# Patient Record
Sex: FEMALE | Race: WHITE | NOT HISPANIC OR LATINO | URBAN - METROPOLITAN AREA
[De-identification: names, ages, dates, MRNs, and addresses within clinical notes are randomized per-mention and may not be internally consistent; named-entity substitution may affect disease eponyms.]

---

## 2017-05-22 ENCOUNTER — GENERIC CONVERSION - ENCOUNTER (OUTPATIENT)
Dept: OTHER | Facility: OTHER | Age: 22
End: 2017-05-22

## 2017-06-12 ENCOUNTER — GENERIC CONVERSION - ENCOUNTER (OUTPATIENT)
Dept: OTHER | Facility: OTHER | Age: 22
End: 2017-06-12

## 2017-11-24 ENCOUNTER — ALLSCRIPTS OFFICE VISIT (OUTPATIENT)
Dept: OTHER | Facility: OTHER | Age: 22
End: 2017-11-24

## 2017-11-24 DIAGNOSIS — R35.0 FREQUENCY OF MICTURITION: ICD-10-CM

## 2017-11-24 LAB
BILIRUB UR QL STRIP: ABNORMAL
COLOR UR: YELLOW
GLUCOSE (HISTORICAL): ABNORMAL
HGB UR QL STRIP.AUTO: ABNORMAL
KETONES UR STRIP-MCNC: ABNORMAL MG/DL
LEUKOCYTE ESTERASE UR QL STRIP: ABNORMAL
NITRITE UR QL STRIP: ABNORMAL
PH UR STRIP.AUTO: 5.5 [PH]
PROT UR STRIP-MCNC: ABNORMAL MG/DL
SP GR UR STRIP.AUTO: 1.02
UROBILINOGEN UR QL STRIP.AUTO: 0.2

## 2017-11-26 NOTE — PROGRESS NOTES
Assessment    1  Acute lower UTI (599 0) (N39 0)    Plan  Acute lower UTI    · Sulfamethoxazole-Trimethoprim 400-80 MG Oral Tablet; TAKE 1 TABLET TWICEDAILY   · Drink at least 6 glasses of clear liquids a day ; Status:Complete;   Done: 56NKT8454   · Drink plenty of fluids ; Status:Complete;   Done: 25XCP1284   · There are several things women can do to treat and prevent bladder infections  ;Status:Complete;   Done: 21AUN0486   · Call [phone] if: Pain when you urinate is not better in 3 days ; Status:Complete;  Done: 13JAH0551   · Call [phone] if: The symptoms come back after the medications are finished  ;Status:Complete;   Done: 26EDI3491   · Call [phone] if: You have nausea and vomiting that lasts longer than 8 hours  ;Status:Complete;   Done: 21QXN9111   · Call [phone] if: You have pain in the kidney or low back area ; Status:Complete;  Done: 00LTV9743   · Call [phone] if: You see blood in your urine ; Status:Complete;   Done:04Itj3773   · Call [phone] if: You start vomiting ; Status:Complete;   Done: 15UUO0679   · Call [phone] if: Your temperature is higher than 101F ; Status:Complete;   Done:24Nov2017  Health Maintenance    · Stop: Fluzone Quadrivalent 0 5 ML Intramuscular Suspension PrefilledSyringe  Urinary frequency    · (1) URINE CULTURE; Source:Urine, Clean Catch; Status:Active; Requestedfor:24Nov2017;    · Urine Dip Automated- POC; Status:Complete;   Done: 85PSW8206 02:49PM    Discussion/Summary    Discussed with patient will treat for infection and will send out urine for culture  Possible side effects of new medications were reviewed with the patient/guardian today  The treatment plan was reviewed with the patient/guardian   The patient/guardian understands and agrees with the treatment plan      Chief Complaint  UTI symptoms      History of Present Illness  HPI: Patient here with complaints of having frequency and burning with urination present for the past week no back pain or fevers that she is aware of  No nausea  Review of Systems   Constitutional: as noted in HPI   ENT: no ear ache, no loss of hearing, no nosebleeds or nasal discharge, no sore throat or hoarseness  Cardiovascular: no complaints of slow or fast heart rate, no chest pain, no palpitations, no leg claudication or lower extremity edema  Respiratory: no complaints of shortness of breath, no wheezing, no dyspnea on exertion, no orthopnea or PND  Gastrointestinal: no complaints of abdominal pain, no constipation, no nausea or diarrhea, no vomiting, no bloody stools  Genitourinary: as noted in HPI  Musculoskeletal: no complaints of arthralgia, no myalgia, no joint swelling or stiffness, no limb pain or swelling  ROS reviewed  Active Problems  1  Abdominal pain (789 00) (R10 9)   2  Need for Menactra vaccination (V03 89) (Z23)   3  Need for Tdap vaccination (V06 1) (Z23)   4  PPD screening test (V74 1) (Z11 1)    Past Medical History  1  History of bronchitis (V12 69) (Z87 09)   2  History of gastritis (V12 79) (Z87 19)   3  History of otitis media (V12 49) (Z86 69)   4  History of pharyngitis (V12 69) (Z87 09)  Active Problems And Past Medical History Reviewed: The active problems and past medical history were reviewed and updated today  Family History  Family History Reviewed: The family history was reviewed and updated today  Social History  The social history was reviewed and updated today  Surgical History  1  History of Tonsillectomy With Adenoidectomy  Surgical History Reviewed: The surgical history was reviewed and updated today  Current Meds   1  Orsythia 0 1-20 MG-MCG Oral Tablet; Take 1 tablet daily Recorded    The medication list was reviewed and updated today  Allergies  1   No Known Drug Allergies    Vitals   Recorded: 98YTH7046 02:43PM   Temperature 98 4 F   Heart Rate 72   Systolic 419   Diastolic 74   Height 5 ft 4 in   Weight 123 lb 8 0 oz   BMI Calculated 21 2   BSA Calculated 1 6   O2 Saturation 98       Physical Exam   Constitutional  General appearance: No acute distress, well appearing and well nourished  Pulmonary  Respiratory effort: No increased work of breathing or signs of respiratory distress  Auscultation of lungs: Clear to auscultation  Cardiovascular  Auscultation of heart: Normal rate and rhythm, normal S1 and S2, without murmurs  Examination of extremities for edema and/or varicosities: Normal    Abdomen  Abdomen: Non-tender, no masses  Liver and spleen: No hepatomegaly or splenomegaly  Lymphatic  Palpation of lymph nodes in neck: No lymphadenopathy  Results/Data  PHQ-9 Adult Depression Screening 65URJ3102 03:06PM User, [employer]     Test Name Result Flag Reference   PHQ-9 Adult Depression Score 0       Over the last two weeks, how often have you been bothered by any of the following problems? Little interest or pleasure in doing things: Not at all - 0 Feeling down, depressed, or hopeless: Not at all - 0 Trouble falling or staying asleep, or sleeping too much: Not at all - 0 Feeling tired or having little energy: Not at all - 0 Poor appetite or over eating: Not at all - 0 Feeling bad about yourself - or that you are a failure or have let yourself or your family down: Not at all - 0 Trouble concentrating on things, such as reading the newspaper or watching television: Not at all - 0 Moving or speaking so slowly that other people could have noticed   Or the opposite -  being so fidgety or restless that you have been moving around a lot more than usual: Not at all - 0 Thoughts that you would be better off dead, or of hurting yourself in some way: Not at all - 0   PHQ-9 Adult Depression Screening Negative     PHQ-9 Difficulty Level Not difficult at all     PHQ-9 Severity No Depression       Urine Dip Automated- POC 71UXH1725 02:49PM Italia Dolin     Test Name Result Flag Reference   Color Yellow     Leukocytes large A    Nitrite neg     Blood large A    Bilirubin neg     Urobilinogen 0 2     Protein neg     Ph 5 5     Specific Gravity 1 025     Ketone trace A    Glucose neg           Signatures   Electronically signed by : Buford Gottron; Nov 24 2017  2:58PM EST                       (Author)    Electronically signed by : Trent Baldwin MD; Nov 25 2017  9:48AM EST                       (Co-author)

## 2017-11-27 ENCOUNTER — APPOINTMENT (OUTPATIENT)
Dept: LAB | Facility: HOSPITAL | Age: 22
End: 2017-11-27
Payer: COMMERCIAL

## 2017-11-27 DIAGNOSIS — R35.0 FREQUENCY OF MICTURITION: ICD-10-CM

## 2017-11-27 PROCEDURE — 87077 CULTURE AEROBIC IDENTIFY: CPT

## 2017-11-27 PROCEDURE — 87086 URINE CULTURE/COLONY COUNT: CPT

## 2017-11-27 PROCEDURE — 87186 SC STD MICRODIL/AGAR DIL: CPT

## 2017-11-29 ENCOUNTER — GENERIC CONVERSION - ENCOUNTER (OUTPATIENT)
Dept: OTHER | Facility: OTHER | Age: 22
End: 2017-11-29

## 2017-11-29 LAB — BACTERIA UR CULT: ABNORMAL

## 2017-12-21 ENCOUNTER — ALLSCRIPTS OFFICE VISIT (OUTPATIENT)
Dept: OTHER | Facility: OTHER | Age: 22
End: 2017-12-21

## 2018-01-12 VITALS
WEIGHT: 123.5 LBS | DIASTOLIC BLOOD PRESSURE: 74 MMHG | BODY MASS INDEX: 21.08 KG/M2 | TEMPERATURE: 98.4 F | OXYGEN SATURATION: 98 % | SYSTOLIC BLOOD PRESSURE: 122 MMHG | HEART RATE: 72 BPM | HEIGHT: 64 IN

## 2018-01-15 NOTE — RESULT NOTES
Discussion/Summary   Positive for infection and the bactrim she is taking will treat infection      Verified Results  (1) URINE CULTURE 27Nov2017 06:14PM Johnson Hernandez    Order Number: [id number]     Test Name Result Flag Reference   CLINICAL REPORT (Report) A    Test:        Urine culture  Specimen Type:   Urine  Specimen Date:   11/27/2017 6:14 PM  Result Date:    11/29/2017 1:55 PM  Result Status:   Final result  Abnormal:      Yes  Resulting Lab:   [facility] [address] [place] [address]            Tel: [phone]      CULTURE                                       ------------------                                       80,000-89,000 cfu/ml Escherichia coli (Abnormal)      SUSCEPTIBILITY                                   ------------------                                                       Escherichia coli  METHOD                 ISAIAS  -------------------------------------  --------------------------  AMPICILLIN ($$)             >16 00 ug/ml Resistant  AMPICILLIN + SULBACTAM ($)       16/8 ug/ml  Intermediate  AZTREONAM ($$$)             <=8 ug/ml   Susceptible  CEFAZOLIN ($)              <=8 00 ug/ml Susceptible  CIPROFLOXACIN ($)            <=1 00 ug/ml Susceptible  GENTAMICIN ($$)             <=4 ug/ml   Susceptible  LEVOFLOXACIN ($)            <=2 00 ug/ml Susceptible  NITROFURANTOIN             <=32 ug/ml  Susceptible  PIPERACILLIN + TAZOBACTAM ($$$)     <=16 ug/ml  Susceptible  TETRACYCLINE              >8 ug/ml   Resistant  TOBRAMYCIN ($)             <=4 ug/ml   Susceptible  TRIMETHOPRIM + SULFAMETHOXAZOLE ($$$)  <=2/38 ug/ml Susceptible

## 2018-01-23 VITALS
BODY MASS INDEX: 21.06 KG/M2 | HEART RATE: 78 BPM | OXYGEN SATURATION: 98 % | TEMPERATURE: 98.5 F | WEIGHT: 123.38 LBS | DIASTOLIC BLOOD PRESSURE: 78 MMHG | SYSTOLIC BLOOD PRESSURE: 120 MMHG | HEIGHT: 64 IN

## 2018-01-23 NOTE — MISCELLANEOUS
Message  Return to work or school:   Kevin Romero is under my professional care   She was seen in my office on 12/21/17   She is able to return to work on  12/26/17            Signatures   Electronically signed by : Jagdish Wray; Dec 21 2017  3:50PM EST                       (Author)

## 2020-09-14 ENCOUNTER — OFFICE VISIT (OUTPATIENT)
Dept: FAMILY MEDICINE CLINIC | Facility: CLINIC | Age: 25
End: 2020-09-14
Payer: COMMERCIAL

## 2020-09-14 ENCOUNTER — TELEPHONE (OUTPATIENT)
Dept: ADMINISTRATIVE | Facility: OTHER | Age: 25
End: 2020-09-14

## 2020-09-14 VITALS
OXYGEN SATURATION: 97 % | HEIGHT: 63 IN | BODY MASS INDEX: 21.33 KG/M2 | RESPIRATION RATE: 16 BRPM | TEMPERATURE: 98.1 F | DIASTOLIC BLOOD PRESSURE: 64 MMHG | HEART RATE: 88 BPM | SYSTOLIC BLOOD PRESSURE: 112 MMHG | WEIGHT: 120.4 LBS

## 2020-09-14 DIAGNOSIS — F41.9 ANXIETY: Primary | ICD-10-CM

## 2020-09-14 DIAGNOSIS — Z23 NEED FOR INFLUENZA VACCINATION: ICD-10-CM

## 2020-09-14 PROCEDURE — 90686 IIV4 VACC NO PRSV 0.5 ML IM: CPT | Performed by: NURSE PRACTITIONER

## 2020-09-14 PROCEDURE — 99213 OFFICE O/P EST LOW 20 MIN: CPT | Performed by: NURSE PRACTITIONER

## 2020-09-14 PROCEDURE — 90471 IMMUNIZATION ADMIN: CPT | Performed by: NURSE PRACTITIONER

## 2020-09-14 PROCEDURE — 3725F SCREEN DEPRESSION PERFORMED: CPT | Performed by: NURSE PRACTITIONER

## 2020-09-14 RX ORDER — ESCITALOPRAM OXALATE 5 MG/1
5 TABLET ORAL DAILY
Qty: 30 TABLET | Refills: 2 | Status: SHIPPED | OUTPATIENT
Start: 2020-09-14 | End: 2020-10-14 | Stop reason: SDUPTHER

## 2020-09-14 RX ORDER — MEDROXYPROGESTERONE ACETATE 150 MG/ML
INJECTION, SUSPENSION INTRAMUSCULAR
COMMUNITY
Start: 2020-08-09 | End: 2021-08-18 | Stop reason: ALTCHOICE

## 2020-09-14 NOTE — TELEPHONE ENCOUNTER
----- Message from Becky Flower MA sent at 9/14/2020 12:40 PM EDT -----  Regarding: pap  09/14/20 12:40 PM    Hello, our patient Cj Patton has had Pap Smear (HPV) aka Cervical Cancer Screening completed/performed  Please assist in updating the patient chart by pulling the Care Everywhere (CE) document  The date of service is 2019       Thank you,  Becky Flower MA  [facility]

## 2020-09-14 NOTE — PROGRESS NOTES
Assessment/Plan:           Problem List Items Addressed This Visit        Other    Anxiety - Primary     Discussed options with patient will start Lexapro 5 mg daily and will f/u in 3 weeks for follow up          Relevant Medications    escitalopram (LEXAPRO) 5 mg tablet    Need for influenza vaccination    Relevant Orders    influenza vaccine, quadrivalent, 0 5 mL, preservative-free, for adult and pediatric patients 6 mos+ (AFLURIA, FLUARIX, FLULAVAL, FLUZONE) (Completed)            Subjective:      Patient ID: Nikos Francis is a 23 y o  female  Patient here today about her anxiety and reports that she has had anxiety for years and a few months ago having some paranoid about things all summer when she was driving thinking she was going to be in an accident or having someone break into her home  Patient denies any recent stress and is in a relationship  Patient denies any drugs or alcohol  Patient talks with her friends about how she is feeling  Patient positive history in the family  Patient denies any SI and no hospitalizations for mental health issues and feeling well  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  Patient did attend therapy as a child when her dad passed away  Patient has not taken a medication  Patient is feeling anxious and on edge feeling like she is irritable and hard to stop worrying about something awful may happen and concentration is affected  MARISSA-7 score 18 and PHQ-9 score 4       The following portions of the patient's history were reviewed and updated as appropriate: She  has no past medical history on file  She   Patient Active Problem List    Diagnosis Date Noted    Anxiety 09/14/2020    Need for influenza vaccination 09/14/2020     She  has no past surgical history on file  Her family history is not on file  She  has no history on file for tobacco, alcohol, and drug    She is allergic to dog epithelium allergy skin test     Review of Systems Constitutional: Negative for activity change, appetite change, chills, diaphoresis, fatigue, fever and unexpected weight change  HENT: Negative  Negative for congestion, ear pain, hearing loss, postnasal drip, sinus pressure, sinus pain, sneezing and sore throat  Eyes: Negative  Negative for pain, redness and visual disturbance  Respiratory: Negative  Negative for cough and shortness of breath  Cardiovascular: Negative for chest pain and leg swelling  Gastrointestinal: Negative for abdominal pain, diarrhea, nausea and vomiting  Endocrine: Negative  Genitourinary: Negative  Musculoskeletal: Negative for arthralgias  Skin: Negative  Allergic/Immunologic: Negative  Neurological: Negative for dizziness and light-headedness  Hematological: Negative  Psychiatric/Behavioral: Positive for decreased concentration  Negative for behavioral problems, dysphoric mood, self-injury, sleep disturbance and suicidal ideas  The patient is nervous/anxious  Objective:      /64 (BP Location: Left arm, Patient Position: Sitting, Cuff Size: Standard)   Pulse 88   Temp 98 1 °F (36 7 °C)   Resp 16   Ht 5' 3" (1 6 m)   Wt 54 6 kg (120 lb 6 4 oz)   SpO2 97%   BMI 21 33 kg/m²     Depression Screening Follow-up Plan: Patient's depression screening was positive with a PHQ-2 score of   Their PHQ-9 score was   Patient assessed for underlying major depression  They have no active suicidal ideations  Brief counseling provided and recommend additional follow-up/re-evaluation next office visit  Physical Exam  Vitals signs and nursing note reviewed  Constitutional:       General: She is not in acute distress  Appearance: She is well-developed  HENT:      Head: Normocephalic and atraumatic  Eyes:      Pupils: Pupils are equal, round, and reactive to light  Neck:      Musculoskeletal: Normal range of motion  Thyroid: No thyromegaly     Cardiovascular:      Rate and Rhythm: Normal rate and regular rhythm  Heart sounds: Normal heart sounds  No murmur  Pulmonary:      Effort: Pulmonary effort is normal  No respiratory distress  Breath sounds: Normal breath sounds  No wheezing  Abdominal:      General: Bowel sounds are normal       Palpations: Abdomen is soft  Musculoskeletal: Normal range of motion  Skin:     General: Skin is warm and dry  Neurological:      Mental Status: She is alert and oriented to person, place, and time  Psychiatric:         Attention and Perception: Attention and perception normal          Mood and Affect: Affect normal  Mood is anxious  Speech: Speech is rapid and pressured  Behavior: Behavior normal  Behavior is cooperative  Thought Content:  Thought content normal          Cognition and Memory: Cognition normal          Judgment: Judgment normal       Comments: Patient making limited eye contact and shaking leg entire visit

## 2020-09-14 NOTE — TELEPHONE ENCOUNTER
Upon review of the In Basket request we were able to locate, review, and update the patient chart as requested for Pap Smear (HPV) aka Cervical Cancer Screening  Any additional questions or concerns should be emailed to the Practice Liaisons via [email or web address] email, please do not reply via In Basket      Thank you  Marly Arriaza

## 2020-10-05 ENCOUNTER — OFFICE VISIT (OUTPATIENT)
Dept: FAMILY MEDICINE CLINIC | Facility: CLINIC | Age: 25
End: 2020-10-05
Payer: COMMERCIAL

## 2020-10-05 VITALS
HEART RATE: 81 BPM | SYSTOLIC BLOOD PRESSURE: 120 MMHG | BODY MASS INDEX: 21.26 KG/M2 | WEIGHT: 120 LBS | TEMPERATURE: 98.4 F | HEIGHT: 63 IN | DIASTOLIC BLOOD PRESSURE: 74 MMHG | OXYGEN SATURATION: 97 %

## 2020-10-05 DIAGNOSIS — F41.9 ANXIETY: Primary | ICD-10-CM

## 2020-10-05 PROBLEM — Z23 NEED FOR INFLUENZA VACCINATION: Status: RESOLVED | Noted: 2020-09-14 | Resolved: 2020-10-05

## 2020-10-05 PROCEDURE — 99213 OFFICE O/P EST LOW 20 MIN: CPT | Performed by: NURSE PRACTITIONER

## 2020-10-05 PROCEDURE — 1036F TOBACCO NON-USER: CPT | Performed by: NURSE PRACTITIONER

## 2020-10-14 DIAGNOSIS — F41.9 ANXIETY: ICD-10-CM

## 2020-10-14 RX ORDER — ESCITALOPRAM OXALATE 10 MG/1
10 TABLET ORAL DAILY
Qty: 30 TABLET | Refills: 2 | Status: SHIPPED | OUTPATIENT
Start: 2020-10-14 | End: 2020-11-13 | Stop reason: SDUPTHER

## 2020-11-02 ENCOUNTER — APPOINTMENT (OUTPATIENT)
Dept: LAB | Facility: CLINIC | Age: 25
End: 2020-11-02
Payer: COMMERCIAL

## 2020-11-02 ENCOUNTER — OFFICE VISIT (OUTPATIENT)
Dept: FAMILY MEDICINE CLINIC | Facility: CLINIC | Age: 25
End: 2020-11-02
Payer: COMMERCIAL

## 2020-11-02 VITALS
BODY MASS INDEX: 21.02 KG/M2 | HEART RATE: 88 BPM | OXYGEN SATURATION: 96 % | HEIGHT: 63 IN | WEIGHT: 118.6 LBS | RESPIRATION RATE: 16 BRPM | DIASTOLIC BLOOD PRESSURE: 66 MMHG | SYSTOLIC BLOOD PRESSURE: 112 MMHG | TEMPERATURE: 97 F

## 2020-11-02 DIAGNOSIS — F41.9 ANXIETY: ICD-10-CM

## 2020-11-02 DIAGNOSIS — N92.3 INTERMENSTRUAL SPOTTING: ICD-10-CM

## 2020-11-02 DIAGNOSIS — Z00.00 ANNUAL PHYSICAL EXAM: Primary | ICD-10-CM

## 2020-11-02 LAB
ALBUMIN SERPL BCP-MCNC: 4.5 G/DL (ref 3.5–5)
ALP SERPL-CCNC: 53 U/L (ref 46–116)
ALT SERPL W P-5'-P-CCNC: 21 U/L (ref 12–78)
ANION GAP SERPL CALCULATED.3IONS-SCNC: 6 MMOL/L (ref 4–13)
AST SERPL W P-5'-P-CCNC: 25 U/L (ref 5–45)
BASOPHILS # BLD AUTO: 0.07 THOUSANDS/ΜL (ref 0–0.1)
BASOPHILS NFR BLD AUTO: 1 % (ref 0–1)
BILIRUB SERPL-MCNC: 1.57 MG/DL (ref 0.2–1)
BUN SERPL-MCNC: 13 MG/DL (ref 5–25)
CALCIUM SERPL-MCNC: 9.1 MG/DL (ref 8.3–10.1)
CHLORIDE SERPL-SCNC: 109 MMOL/L (ref 100–108)
CO2 SERPL-SCNC: 27 MMOL/L (ref 21–32)
CREAT SERPL-MCNC: 0.99 MG/DL (ref 0.6–1.3)
EOSINOPHIL # BLD AUTO: 0.27 THOUSAND/ΜL (ref 0–0.61)
EOSINOPHIL NFR BLD AUTO: 5 % (ref 0–6)
ERYTHROCYTE [DISTWIDTH] IN BLOOD BY AUTOMATED COUNT: 12.7 % (ref 11.6–15.1)
FERRITIN SERPL-MCNC: 32 NG/ML (ref 8–388)
GFR SERPL CREATININE-BSD FRML MDRD: 81 ML/MIN/1.73SQ M
GLUCOSE SERPL-MCNC: 72 MG/DL (ref 65–140)
HCT VFR BLD AUTO: 43.6 % (ref 34.8–46.1)
HGB BLD-MCNC: 14.7 G/DL (ref 11.5–15.4)
IMM GRANULOCYTES # BLD AUTO: 0 THOUSAND/UL (ref 0–0.2)
IMM GRANULOCYTES NFR BLD AUTO: 0 % (ref 0–2)
IRON SATN MFR SERPL: 32 %
IRON SERPL-MCNC: 115 UG/DL (ref 50–170)
LYMPHOCYTES # BLD AUTO: 2.29 THOUSANDS/ΜL (ref 0.6–4.47)
LYMPHOCYTES NFR BLD AUTO: 39 % (ref 14–44)
MCH RBC QN AUTO: 30.7 PG (ref 26.8–34.3)
MCHC RBC AUTO-ENTMCNC: 33.7 G/DL (ref 31.4–37.4)
MCV RBC AUTO: 91 FL (ref 82–98)
MONOCYTES # BLD AUTO: 0.42 THOUSAND/ΜL (ref 0.17–1.22)
MONOCYTES NFR BLD AUTO: 7 % (ref 4–12)
NEUTROPHILS # BLD AUTO: 2.9 THOUSANDS/ΜL (ref 1.85–7.62)
NEUTS SEG NFR BLD AUTO: 48 % (ref 43–75)
NRBC BLD AUTO-RTO: 0 /100 WBCS
PLATELET # BLD AUTO: 258 THOUSANDS/UL (ref 149–390)
PMV BLD AUTO: 9.8 FL (ref 8.9–12.7)
POTASSIUM SERPL-SCNC: 4.1 MMOL/L (ref 3.5–5.3)
PROT SERPL-MCNC: 7.8 G/DL (ref 6.4–8.2)
RBC # BLD AUTO: 4.79 MILLION/UL (ref 3.81–5.12)
SODIUM SERPL-SCNC: 142 MMOL/L (ref 136–145)
TIBC SERPL-MCNC: 359 UG/DL (ref 250–450)
TSH SERPL DL<=0.05 MIU/L-ACNC: 1.13 UIU/ML (ref 0.36–3.74)
WBC # BLD AUTO: 5.95 THOUSAND/UL (ref 4.31–10.16)

## 2020-11-02 PROCEDURE — 99395 PREV VISIT EST AGE 18-39: CPT | Performed by: NURSE PRACTITIONER

## 2020-11-02 PROCEDURE — 84443 ASSAY THYROID STIM HORMONE: CPT

## 2020-11-02 PROCEDURE — 83540 ASSAY OF IRON: CPT

## 2020-11-02 PROCEDURE — 85025 COMPLETE CBC W/AUTO DIFF WBC: CPT

## 2020-11-02 PROCEDURE — 3008F BODY MASS INDEX DOCD: CPT | Performed by: NURSE PRACTITIONER

## 2020-11-02 PROCEDURE — 82728 ASSAY OF FERRITIN: CPT

## 2020-11-02 PROCEDURE — 36415 COLL VENOUS BLD VENIPUNCTURE: CPT

## 2020-11-02 PROCEDURE — 80053 COMPREHEN METABOLIC PANEL: CPT

## 2020-11-02 PROCEDURE — 83550 IRON BINDING TEST: CPT

## 2020-11-13 DIAGNOSIS — F41.9 ANXIETY: ICD-10-CM

## 2020-11-13 RX ORDER — ESCITALOPRAM OXALATE 10 MG/1
10 TABLET ORAL DAILY
Qty: 30 TABLET | Refills: 0 | Status: SHIPPED | OUTPATIENT
Start: 2020-11-13 | End: 2021-02-09 | Stop reason: ALTCHOICE

## 2021-02-09 ENCOUNTER — TELEMEDICINE (OUTPATIENT)
Dept: FAMILY MEDICINE CLINIC | Facility: CLINIC | Age: 26
End: 2021-02-09
Payer: COMMERCIAL

## 2021-02-09 DIAGNOSIS — F41.9 ANXIETY: Primary | ICD-10-CM

## 2021-02-09 PROCEDURE — 99213 OFFICE O/P EST LOW 20 MIN: CPT | Performed by: NURSE PRACTITIONER

## 2021-02-09 RX ORDER — SERTRALINE HYDROCHLORIDE 25 MG/1
25 TABLET, FILM COATED ORAL DAILY
Qty: 30 TABLET | Refills: 1 | Status: SHIPPED | OUTPATIENT
Start: 2021-02-09 | End: 2021-03-04

## 2021-02-09 NOTE — PROGRESS NOTES
Virtual Regular Visit      Assessment/Plan:    Problem List Items Addressed This Visit        Other    Anxiety - Primary     Patient will decrease her dose of Lexapro to 5 mg for the next two weeks then stop and also start the Sertraline 25 mg daily patient educated about the medications r/b of taking medication and taking 4-6 weeks to appreciate the effects of the medication will f/u in 4 weeks also discussed considering therapy as well  Relevant Medications    sertraline (ZOLOFT) 25 mg tablet               Reason for visit is   Chief Complaint   Patient presents with    Virtual Regular Visit        Encounter provider Jagdish Osuna [facility]    Provider located at [place] [address] [place] [id number]      Recent Visits  No visits were found meeting these conditions  Showing recent visits within past 7 days and meeting all other requirements     Today's Visits  Date Type Provider Dept   02/09/21 Telemedicine RONNIE Osuna [facility]   Showing today's visits and meeting all other requirements     Future Appointments  No visits were found meeting these conditions  Showing future appointments within next 150 days and meeting all other requirements        The patient was identified by name and date of birth  Naman Gonzalez was informed that this is a telemedicine visit and that the visit is being conducted through [address] [place] [address] and patient was informed that this is not a secure, HIPAA-compliant platform  She agrees to proceed     My office door was closed  No one else was in the room  She acknowledged consent and understanding of privacy and security of the video platform  The patient has agreed to participate and understands they can discontinue the visit at any time  Patient is aware this is a billable service  Subjective  Naman Gonzalez is a 23 y o  female          Patient is calling today and reports that she is having problems with her anxiety and thinking the medication is not effective  She is experiencing panic attacks and is not feeling well on the lexapro  She is having decrease in her energy motivation and finding she is more forgetful and fatigued no new triggering events  No past medical history on file  No past surgical history on file  Current Outpatient Medications   Medication Sig Dispense Refill    medroxyPROGESTERone acetate (DEPO-PROVERA SYRINGE) 150 mg/mL injection INJECT 150 MG INJECT INTO THE MUSCLE EVERY 3 (THREE) MONTHS   sertraline (ZOLOFT) 25 mg tablet Take 1 tablet (25 mg total) by mouth daily 30 tablet 1     No current facility-administered medications for this visit  Allergies   Allergen Reactions    Dog Epithelium Allergy Skin Test Other (See Comments)       Review of Systems   Constitutional: Positive for fatigue  Negative for activity change, appetite change, chills, diaphoresis, fever and unexpected weight change  HENT: Negative for congestion, ear pain, hearing loss, postnasal drip, sinus pressure, sinus pain, sneezing and sore throat  Eyes: Negative for pain, redness and visual disturbance  Respiratory: Negative for cough and shortness of breath  Cardiovascular: Negative for chest pain and leg swelling  Gastrointestinal: Negative for abdominal pain, diarrhea, nausea and vomiting  Musculoskeletal: Negative for arthralgias  Neurological: Negative for dizziness and light-headedness  Psychiatric/Behavioral: Negative for behavioral problems and dysphoric mood  The patient is nervous/anxious  Video Exam    There were no vitals filed for this visit  Physical Exam  Constitutional:       Appearance: Normal appearance  HENT:      Head: Normocephalic  Pulmonary:      Effort: Pulmonary effort is normal       Breath sounds: Normal breath sounds  Neurological:      Mental Status: She is alert and oriented to person, place, and time     Psychiatric: Mood and Affect: Mood normal          Behavior: Behavior normal          Thought Content: Thought content normal          Judgment: Judgment normal           I spent 15 minutes directly with the patient during this visit      Tian Garcia acknowledges that she has consented to an online visit or consultation  She understands that the online visit is based solely on information provided by her, and that, in the absence of a face-to-face physical evaluation by the physician, the diagnosis she receives is both limited and provisional in terms of accuracy and completeness  This is not intended to replace a full medical face-to-face evaluation by the physician  Joss Holbrook understands and accepts these terms

## 2021-02-09 NOTE — ASSESSMENT & PLAN NOTE
Patient will decrease her dose of Lexapro to 5 mg for the next two weeks then stop and also start the Sertraline 25 mg daily patient educated about the medications r/b of taking medication and taking 4-6 weeks to appreciate the effects of the medication will f/u in 4 weeks also discussed considering therapy as well

## 2021-03-04 DIAGNOSIS — F41.9 ANXIETY: ICD-10-CM

## 2021-03-04 RX ORDER — SERTRALINE HYDROCHLORIDE 25 MG/1
TABLET, FILM COATED ORAL
Qty: 30 TABLET | Refills: 1 | Status: SHIPPED | OUTPATIENT
Start: 2021-03-04 | End: 2021-04-06

## 2021-04-06 DIAGNOSIS — F41.9 ANXIETY: ICD-10-CM

## 2021-04-06 RX ORDER — SERTRALINE HYDROCHLORIDE 25 MG/1
TABLET, FILM COATED ORAL
Qty: 30 TABLET | Refills: 1 | Status: SHIPPED | OUTPATIENT
Start: 2021-04-06 | End: 2021-04-20

## 2021-04-20 DIAGNOSIS — F41.9 ANXIETY: ICD-10-CM

## 2021-04-20 RX ORDER — SERTRALINE HYDROCHLORIDE 25 MG/1
TABLET, FILM COATED ORAL
Qty: 90 TABLET | Refills: 1 | Status: SHIPPED | OUTPATIENT
Start: 2021-04-20 | End: 2021-08-18 | Stop reason: ALTCHOICE

## 2021-04-30 ENCOUNTER — OFFICE VISIT (OUTPATIENT)
Dept: FAMILY MEDICINE CLINIC | Facility: CLINIC | Age: 26
End: 2021-04-30
Payer: COMMERCIAL

## 2021-04-30 VITALS
HEART RATE: 83 BPM | DIASTOLIC BLOOD PRESSURE: 70 MMHG | BODY MASS INDEX: 21.4 KG/M2 | WEIGHT: 120.8 LBS | SYSTOLIC BLOOD PRESSURE: 122 MMHG | TEMPERATURE: 98.1 F | OXYGEN SATURATION: 99 % | HEIGHT: 63 IN

## 2021-04-30 DIAGNOSIS — F41.9 ANXIETY: Primary | ICD-10-CM

## 2021-04-30 PROCEDURE — 1036F TOBACCO NON-USER: CPT | Performed by: PHYSICIAN ASSISTANT

## 2021-04-30 PROCEDURE — 3008F BODY MASS INDEX DOCD: CPT | Performed by: PHYSICIAN ASSISTANT

## 2021-04-30 PROCEDURE — 3725F SCREEN DEPRESSION PERFORMED: CPT | Performed by: PHYSICIAN ASSISTANT

## 2021-04-30 PROCEDURE — 99214 OFFICE O/P EST MOD 30 MIN: CPT | Performed by: PHYSICIAN ASSISTANT

## 2021-04-30 RX ORDER — CETIRIZINE HYDROCHLORIDE 10 MG/1
10 TABLET ORAL DAILY
COMMUNITY

## 2021-04-30 RX ORDER — HYDROXYZINE HYDROCHLORIDE 25 MG/1
25 TABLET, FILM COATED ORAL EVERY 8 HOURS PRN
Qty: 30 TABLET | Refills: 0 | Status: SHIPPED | OUTPATIENT
Start: 2021-04-30 | End: 2021-08-18 | Stop reason: SDUPTHER

## 2021-04-30 NOTE — PROGRESS NOTES
Assessment/Plan:    No problem-specific Assessment & Plan notes found for this encounter  Problem List Items Addressed This Visit        Other    Anxiety - Primary    Relevant Medications    hydrOXYzine HCL (ATARAX) 25 mg tablet    Other Relevant Orders    Ambulatory referral to Psychiatry            Subjective:      Patient ID: Antonio Barboza is a 23 y o  female  Anxiety  Patient is here for evaluation of anxiety  She has the following anxiety symptoms: difficulty concentrating  Onset of symptoms was approximately several years ago  Symptoms have been gradually worsening since that time  She denies current suicidal and homicidal ideation  Family history significant for no psychiatric illness  Possible organic causes contributing are: none  Risk factors: none Previous treatment includes medication Lexapro and Zoloft  She complains that the prior medication Lexapro made her feel depressed  she does not note depression to me  She describes the anxiety as difficulty concentrating  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  She often feels panic episodes at work and at bedtime  Denies supplement use  The following portions of the patient's history were reviewed and updated as appropriate: allergies, past family history, past medical history, past social history, past surgical history and problem list     Review of Systems   Constitutional: Negative for chills, fatigue and fever  HENT: Negative for congestion, ear pain, sinus pain, sore throat and trouble swallowing  Eyes: Negative for pain, discharge and redness  Respiratory: Negative for cough, chest tightness, shortness of breath and wheezing  Cardiovascular: Negative for chest pain, palpitations and leg swelling  Gastrointestinal: Negative for abdominal pain, diarrhea, nausea and vomiting  Musculoskeletal: Negative for arthralgias, joint swelling and myalgias  Skin: Negative for rash     Neurological: Negative for dizziness, weakness, numbness and headaches  Psychiatric/Behavioral: Negative for sleep disturbance and suicidal ideas  The patient is nervous/anxious  Objective:      /70 (BP Location: Left arm, Patient Position: Sitting, Cuff Size: Standard)   Pulse 83   Temp 98 1 °F (36 7 °C)   Ht 5' 3" (1 6 m)   Wt 54 8 kg (120 lb 12 8 oz)   SpO2 99%   BMI 21 40 kg/m²          Physical Exam  Vitals signs and nursing note reviewed  Constitutional:       General: She is not in acute distress  Appearance: Normal appearance  She is well-developed  Cardiovascular:      Rate and Rhythm: Normal rate and regular rhythm  Pulmonary:      Effort: Pulmonary effort is normal       Breath sounds: Normal breath sounds  Abdominal:      General: Bowel sounds are normal       Palpations: Abdomen is soft  Abdomen is not rigid  Tenderness: There is no abdominal tenderness  There is no guarding or rebound  Negative signs include Lindsay's sign and McBurney's sign  Hernia: No hernia is present  Skin:     General: Skin is warm and dry  Psychiatric:         Attention and Perception: Attention normal          Mood and Affect: Mood is anxious  Behavior: Behavior normal          Thought Content:  Thought content normal          Cognition and Memory: Cognition normal          Judgment: Judgment normal

## 2021-05-11 ENCOUNTER — TELEPHONE (OUTPATIENT)
Dept: PSYCHIATRY | Facility: CLINIC | Age: 26
End: 2021-05-11

## 2021-05-11 NOTE — TELEPHONE ENCOUNTER
Patient would like to set up an apt she has a referral on file can you please give her a call thank you

## 2021-06-09 ENCOUNTER — TELEPHONE (OUTPATIENT)
Dept: PSYCHIATRY | Facility: CLINIC | Age: 26
End: 2021-06-09

## 2021-06-11 ENCOUNTER — TELEPHONE (OUTPATIENT)
Dept: PSYCHIATRY | Facility: CLINIC | Age: 26
End: 2021-06-11

## 2021-06-11 NOTE — TELEPHONE ENCOUNTER
Behavorial Health Outpatient Intake Questions    Referred by: PCP     Please advised interviewee that they need to answer all questions truthfully to allow for best care and any misrepresentations of information may affect their ability to be seen at this clinic   => Was this discussed? Yes     [facility]     Presenting Problem (in patient's words): Generalized anxiety disorder and looking for medication mgmt  Also believes my have adult ADHD    Are there any developmental disabilities? ? If yes, can they speak to you on the phone? If they are too limited to speak to you on phone, refer out No    Are you taking any psychiatric medications? Yes  Adderax 25 mg PCP   => If yes, who prescribes? If yes, are they injectable medications? Does the patient have a language barrier or hearing impairment? No    Have you been treated at [facility] by a therapist or a doctor in the past? If yes, who? No    Has the patient been hospitalized for mental health? No   If yes, how long ago was last hospitalization and where was it? Do you actively use alcohol or marijuana or illegal substances? If yes, what and how much - refer out to Drug and alcohol treatment if use is excessive or daily use of illegal substances No concerns of substance abuse are reported  Do you have a community treatment team or ? No    Legal History-     Does the patient have any history of arrests, penitentiary/retirement time, or DUIs? No  If Yes-  1) What types of charges? 2) When were they last incarcerated? 3) Are they currently on parole or probation? Minor Child-    Who has custody of the child? Is there a custody agreement? If there is a custody agreement remind parent that they must bring a copy to the first appt or they will not be seen       Intake Team, please check with provider before scheduling if flags come up such as:  - complex case  - legal history (other than DUI)  - communication barrier concerns are present  - if, in your judgment, this needs further review    ACCEPTED as a patient Yes  => Appointment Date: July 14,2021 at 11:00am with Dr Wanda Harris     Referred Elsewhere? No    Name of Insurance Co: [employer] ID# [id number]  [id number] Phone #  If ins is primary or secondary Primary  If patient is a minor, parents information such as Name, D  O B of guarantor

## 2021-07-14 ENCOUNTER — OFFICE VISIT (OUTPATIENT)
Dept: PSYCHIATRY | Facility: CLINIC | Age: 26
End: 2021-07-14
Payer: COMMERCIAL

## 2021-07-14 VITALS — BODY MASS INDEX: 22.14 KG/M2 | WEIGHT: 125 LBS | SYSTOLIC BLOOD PRESSURE: 121 MMHG | DIASTOLIC BLOOD PRESSURE: 78 MMHG

## 2021-07-14 DIAGNOSIS — Z79.899 MEDICAL MARIJUANA USE: ICD-10-CM

## 2021-07-14 DIAGNOSIS — F41.9 ANXIETY DISORDER, UNSPECIFIED TYPE: Primary | ICD-10-CM

## 2021-07-14 PROCEDURE — 90792 PSYCH DIAG EVAL W/MED SRVCS: CPT | Performed by: STUDENT IN AN ORGANIZED HEALTH CARE EDUCATION/TRAINING PROGRAM

## 2021-07-14 RX ORDER — BUSPIRONE HYDROCHLORIDE 5 MG/1
5 TABLET ORAL 3 TIMES DAILY
Qty: 90 TABLET | Refills: 1 | Status: SHIPPED | OUTPATIENT
Start: 2021-07-14 | End: 2021-09-08

## 2021-07-14 NOTE — BH TREATMENT PLAN
TREATMENT PLAN (Medication Management Only)        [facility]    Name and Date of Birth:  Ashlyn Krause 26 y o  1995  Date of Treatment Plan: July 14, 2021  Diagnosis/Diagnoses:    1  Anxiety disorder, unspecified type    2  Medical marijuana use      Strengths/Personal Resources for Self-Care: supportive family, supportive friends, taking medications as prescribed, ability to communicate needs, average or above intelligence, financial security, general fund of knowledge, independence, willingness to work on problems  Area/Areas of need (in own words): anxiety, panic attacks  1  Long Term Goal: alleviate anxiety  2   Short Term Objective (s) - How will we reach this goal?:   A  Provider new recommended medication/dosage changes and/or continue medication(s): continue current medications as prescribed (Buspoiroine)  B  meditation apps  C  consider psychotherapy  Progress Towards Goals: starting treatment  Treatment Modality: medication management every 4 weeks  Review due 180 days from date of this plan: 6 months - 1/14/2022  Expected length of service: maintenance  My Physician/PA/NP and I have developed this plan together and I agree to work on the goals and objectives  I understand the treatment goals that were developed for my treatment

## 2021-07-14 NOTE — PSYCH
[facility]    Name and Date of Birth:  Nakia Norwood 26 y o  1995 MRN: [id number]    Date of Visit: July 14, 2021    Reason for visit: Full psychiatric intake assessment for medication management       Chief complaint in patient's verbiage: " I have been experiencing problem w/ anxiety for as long as I can rememeber  I was being managed by my PCP, and she referred me to you"     HPI     Nakia Norwood is a 27 y o  female with a past psychiatric history significant for anxiety who presents to the [address] [place] [address] E outpatient clinic for intake assessment  Sahntel Arevalo presents with complaints of anxiety since 2019, without any clear triggers that she could attribute her symptoms to  She holds a card for medical marijuana for anxiety and states it helps at night, but still feels anxious during the day  Patient states she often gets panic attacks  They started last summer and used to have daily panic attacks while driving  Patient then opted to get under the care of a PCP for anxiety and panic attacks  Within the past couple of months her panic attacks have spaced to 3/ week, still mostly when driving, with catastrophic thought prcess where she fears of getting into a car accident with another car or by having a deer run into her car, and the proceeds to envision/imagine her being admitted to a hospital and imminent sense that she was going to die  She states she presents palpitations, shortness of breath and chest tightness  It usually lasts about 3-5 minutes  She denies ever being in a MVA accident herself, and denies  knowing anyone who has had a MVA, or any violent or accidental death  She states she also gets panic attacks and catastrophic thinking at night, with intrusive thoughts that her home will be broken into, even though she lives in a safe neighborhood and her home has never been broken into   She denies knowing any people whose homes had been broken into  Patient denies changes with sleep patterns, and denies changes in appetite, energy levels, attention or concentration capacity, denies psychomotor retardation and denies suicidal ideations, plan or intent  She denies suicidal attempts, and denies ever being admitted into [facility]  She denies visual or auditory hallcuniations    She denies the use of substances other than her medically prescribed marijuana, for which she hold a card for  She denies symptoms consistent w/ mini or ptsd  Recent labs from November 2020 show normal TSH and blood work  Patient sttaes there is a locked hunting turner at home, which is locked without ammunition  Current Rating Scores:     Current PHQ-9   PHQ-9 Score (since 6/13/2021)     PHQ-9 Score  0        MARISSA-7 Flowsheet Screening      Most Recent Value   Over the last 2 weeks, how often have you been bothered by any of the following problems? Feeling nervous, anxious, or on edge  2   Not being able to stop or control worrying  2   Worrying too much about different things  3   Trouble relaxing  0   Being so restless that it is hard to sit still  3   Becoming easily annoyed or irritable  2   Feeling afraid as if something awful might happen  2   MARISSA-7 Total Score  14          Psychiatric Review Of Systems:    Sleep changes: no  Appetite changes: no  Weight changes: no  Energy/anergy: no  Interest/pleasure/anhedonia: no  Somatic symptoms: no  Anxiety/panic: yes  Mini: no  Guilty/hopeless: no  Self injurious behavior/risky behavior: no  Suicidal ideation: no  Homicidal ideation: no  Auditory hallucinations: no  Visual hallucinations: no  Other hallucinations: no  Delusional thinking: no  Eating disorder history: no  Obsessive/compulsive symptoms: no    Review Of Systems: All systems were reviewed and are negative      Family Psychiatric History:     Father w/ depression, ptsd and cocaine use disorder,  from OD  History of anxiety in mother and sister  No other family psychiatric history  No history of suicide in the family  Past Psychiatric History:     Inpatient psychiatric admissions: none  Prior outpatient psychiatric linkage: none  Past/current psychotherapy: past therapy at age 7 after death of her father  History of suicidal attempts/gestures: none  History of violence/aggressive behaviors: none  Psychotropic medication trials: escitalopram (states felt more depressed), sertraline (states did not worlk for her symptoms)  Substance abuse inpatient/outpatient rehabilitation: none    Substance Abuse History:    No history of ETOH, or tobacco abuse  No past legal actions or arrests secondary to substance intoxication  The patient denies prior DWIs/DUIs  No history of outpatient/inpatient rehabilitation programs  James Gomez does not exhibit objective evidence of substance withdrawal during today's examination nor does Rochelle appear under the influence of any psychoactive substance  She is on medical marijuana  Social History:    Developmental: Denies a history of milestone/developmental delay  Denies a history of in-utero exposure to toxins/illicit substances  There is no documented history of IEP or need for special education  Education: post college graduate work or degree  Marital history: single  Living arrangement, social support: lives with mother, stepfather, 2 sisters, CHIP, 2 brothers and 2 CHUYITA  Main support is a close friend  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[place] [address]  Access to firearms: Denies direct access to weapons/firearms  Aubrie Gunning has no history of arrests or violence with a deadly weapon  Traumatic History:     Abuse: none  Other Traumatic Events: none    Past Medical History: Allery induced asthma    Allergies   Allergen Reactions    Dog Epithelium Allergy Skin Test Other (See Comments)       History Review:     The following portions of the patient's history were reviewed and updated as appropriate: allergies, current medications, past family history, past medical history, past social history, past surgical history and problem list     OBJECTIVE:    Vital signs in last 24 hours: There were no vitals filed for this visit      Mental Status Evaluation:    Appearance age appropriate, casually dressed, dressed appropriately, looks stated age   Behavior pleasant, cooperative, appears anxious, restless   Speech normal rate, normal volume, normal pitch   Mood anxious   Affect normal range and intensity, appropriate   Thought Processes organized, goal directed   Associations intact associations   Thought Content no overt delusions   Perceptual Disturbances: no auditory hallucinations, no visual hallucinations   Abnormal Thoughts  Risk Potential Suicidal ideation - None  Homicidal ideation - None  Potential for aggression - No   Orientation oriented to person, place, time/date and situation   Memory recent and remote memory grossly intact   Consciousness alert and awake   Attention Span Concentration Span attention span and concentration are age appropriate   Intellect appears to be of average intelligence   Insight fair   Judgement fair   Muscle Strength and  Gait normal muscle strength and normal muscle tone, normal gait and normal balance   Motor Activity no abnormal movements   Language no difficulty naming common objects, no difficulty repeating a phrase, no difficulty writing a sentence   Fund of Knowledge adequate knowledge of current events  adequate fund of knowledge regarding past history  adequate fund of knowledge regarding vocabulary        Laboratory Results: I have personally reviewed all pertinent laboratory/tests results    Most Recent Labs:   Lab Results   Component Value Date    WBC 5 95 11/02/2020    RBC 4 79 11/02/2020    HGB 14 7 11/02/2020    HCT 43 6 11/02/2020     11/02/2020    RDW 12 7 11/02/2020    NEUTROABS 2 90 11/02/2020    K 4 1 11/02/2020     (H) 11/02/2020    CO2 27 11/02/2020    BUN 13 11/02/2020    CREATININE 0 99 11/02/2020    CALCIUM 9 1 11/02/2020    AST 25 11/02/2020    ALT 21 11/02/2020    ALKPHOS 53 11/02/2020    YPK7OFZVNVPT 1 130 11/02/2020     Most Recent Labs ([facility]):   Lab Results   Component Value Date    WBC 5 95 11/02/2020    RBC 4 79 11/02/2020    HGB 14 7 11/02/2020    HCT 43 6 11/02/2020     11/02/2020    RDW 12 7 11/02/2020    NEUTROABS 2 90 11/02/2020    SODIUM 142 11/02/2020    K 4 1 11/02/2020     (H) 11/02/2020    CO2 27 11/02/2020    BUN 13 11/02/2020    CREATININE 0 99 11/02/2020    GLUC 72 11/02/2020    CALCIUM 9 1 11/02/2020    AST 25 11/02/2020    ALT 21 11/02/2020    ALKPHOS 53 11/02/2020    TP 7 8 11/02/2020    TBILI 1 57 (H) 11/02/2020    [id number] 1 130 11/02/2020     Last Laboratory Results:   Lab Results   Component Value Date    WBC 5 95 11/02/2020    RBC 4 79 11/02/2020    HGB 14 7 11/02/2020    HCT 43 6 11/02/2020     11/02/2020    RDW 12 7 11/02/2020    NEUTROABS 2 90 11/02/2020    K 4 1 11/02/2020     (H) 11/02/2020    CO2 27 11/02/2020    BUN 13 11/02/2020    CREATININE 0 99 11/02/2020    CALCIUM 9 1 11/02/2020    AST 25 11/02/2020    ALT 21 11/02/2020    ALKPHOS 53 11/02/2020       Suicide/Homicide Risk Assessment:    Risk of Harm to Self:  The following ratings are based on assessment at the time of the interview  Demographic risk factors include: , never , age: young adult (15-24)  Historical Risk Factors include: chronic anxiety symptoms  Recent Specific Risk Factors include: significant anxiety symptoms  Protective Factors: no current suicidal ideation, ability to adapt to change, access to mental health treatment, compliant with medications, compliant with mental health treatment, effective coping skills, effective decision-making skills, good health, good self-esteem, having a desire to be alive, having a sense of purpose or meaning in life, healthy fear of risky behaviors and pain, impulse control, opportunities to contribute to community, resiliency, stable living environment, stable job, sense of determination, strong relationships, supportive family, supportive friends  Weapons: locked hunting turner that belongs to her brother/stepfather   The following steps have been taken to ensure weapons are properly secured: locked  Based on today's assessment, Damaris Fabian presents the following risk of harm to self: low    Risk of Harm to Others: The following ratings are based on assessment at the time of the interview  Demographic Risk Factors include: none  Historical Risk Factors include: none  Recent Specific Risk Factors include: none  Protective Factors: no current homicidal ideation  Weapons: rifle  The following steps have been taken to ensure weapons are properly secured: locked, no bullets  Based on today's assessment, Damaris Fabian presents the following risk of harm to others: none    The following interventions are recommended: contracts for safety at present - agrees to go to ED if feeling unsafe, contracts for safety at present - agrees to call Crisis Intervention Service if feeling unsafe  Although patient's acute lethality risk is LOW, long-term/chronic lethality risk is mildly elevated given __  However, at the current moment, Damaris Fabian is future-oriented, forward-thinking, and demonstrates ability to act in a self-preserving manner as evidenced by volitionally presenting to the clinic today, seeking treatment  Additionally, Damaris Fabian sits throughout the assessment wearing personal protective gear (ie mask) in the context of an ongoing viral pandemic, suggesting a will and desire to live  At this juncture, inpatient hospitalization is not currently warranted   To mitigate future risk, patient should adhere to treatment recommendations, avoid alcohol/illicit substance use, utilize community-based resources and familiar support, and prioritize mental health treatment  Assessment/Plan:     James Abrams is a 28 yo F with significant anxiety symptoms and frequent panic attacks with intrusive catastrophic thinking  Recently holding a medical marijuana card for anxiety  No clear stressors or triggers  DSM-V Diagnoses:     1 ) Anxiety disorder unspecified type  2 ) Medical Marijuana      Treatment Recommendations/Precautions:    - Patient was seen and visited with Dr Dariela Lynn  - Discussed her symptoms at length and discussed the impact of marijuana in her current symptoms and context  - Discussed no treatment, treatment options and risks/ benefits and side effects   - Started on Buspirone: 5 mg TID with the plan to increase to 7 5 mg TID over the course of our treatment and follow up  - continue atarax prn  - Discussed the benefits of meditation and meditation apps that she could practive during panic attacks and when needed for sleep  - Discussed sleep hygiene since she gets catastrophic thinking at night w/ thoughts her home will get broken into  - Patient currently declinng psychotherapy, which is advised in this case  Will revisit in next follow up  Medication management every 4 weeks  Aware of 24 hour and weekend coverage for urgent situations accessed by calling [facility] main practice number    Medications Risks/Benefits:      Risks, Benefits And Possible Side Effects Of Medications:    Risks, benefits, and possible side effects of medications explained to James Abrams and she verbalizes understanding and agreement for treatment  Controlled Medication Discussion:     Not applicable    Treatment Plan:    Completed and signed during the session: following covid precaution restrictions      Note Share Disclaimer:      This note was shared with the patient     Nahun King MD 07/14/21

## 2021-08-18 ENCOUNTER — OFFICE VISIT (OUTPATIENT)
Dept: PSYCHIATRY | Facility: CLINIC | Age: 26
End: 2021-08-18
Payer: COMMERCIAL

## 2021-08-18 VITALS — DIASTOLIC BLOOD PRESSURE: 76 MMHG | BODY MASS INDEX: 22.07 KG/M2 | SYSTOLIC BLOOD PRESSURE: 126 MMHG | WEIGHT: 124.6 LBS

## 2021-08-18 DIAGNOSIS — F42.8 OBSESSIVE THINKING: Primary | ICD-10-CM

## 2021-08-18 DIAGNOSIS — Z79.899 MEDICAL MARIJUANA USE: ICD-10-CM

## 2021-08-18 DIAGNOSIS — F41.9 ANXIETY DISORDER, UNSPECIFIED TYPE: ICD-10-CM

## 2021-08-18 PROCEDURE — 3725F SCREEN DEPRESSION PERFORMED: CPT | Performed by: PSYCHIATRY & NEUROLOGY

## 2021-08-18 PROCEDURE — 1036F TOBACCO NON-USER: CPT | Performed by: PSYCHIATRY & NEUROLOGY

## 2021-08-18 PROCEDURE — 99214 OFFICE O/P EST MOD 30 MIN: CPT | Performed by: PSYCHIATRY & NEUROLOGY

## 2021-08-18 RX ORDER — HYDROXYZINE HYDROCHLORIDE 25 MG/1
25 TABLET, FILM COATED ORAL EVERY 8 HOURS PRN
Qty: 30 TABLET | Refills: 0 | Status: SHIPPED | OUTPATIENT
Start: 2021-08-18

## 2021-08-18 NOTE — PSYCH
MEDICATION MANAGEMENT NOTE        [place] [address] [facility]      Name and Date of Birth:  Faby Flores 26 y o  1995 MRN: [id number]    Date of Visit: August 18, 2021    Reason for Visit: Follow-up visit for medication management     SUBJECTIVE:    Faby Flores is a 27 y o  female with past psychiatric history significant for anxiety and obsessive intrusive thoughts of crashing into another car, having a deer jump into her moving car and of having her home broken into, who was personally seen and evaluated today at the [address] [place] [address] E outpatient clinic for follow-up and medication management  Yohan Dhillon presents with unchanged sleep and feels rested, unchanged appetite, unchanged energy, continues presenting difficulty concentrating and focusing (unchanged from baseline)  She denies anhedonia and denies passive death wishes, suicidal or homicidal thought, plan or intent  She denies Visual or auditory hallucinations  In regards to the intrusive symptoms that she described on our initial appointment of fear of crashing into a car while driving +/- having a deer run into her car while driving, she states she has not presented these  However, she states that last night, she did wake up at night and presented intrusive thoughts of having her house being broken into  She states she was able to fall back asleep  Faby Flores is tolerating current medical regimen at current dose, and denies side effects to current medications  She has been compliant with:  Buspirone: 5 mg TID  Atarax: 25 mg prn, but states has not taken any in 2 weeks  Last time she took it was on a flight on her way back from [place], where she was visiting a friend  She states she usually gets anxious when on flights, which is unchanged from baseline  Patient denies suicidal ideation since last time seen in the office  She states her anxiety symptoms have been improving  Current Rating Scores:       MARISSA-7 Flowsheet Screening      Most Recent Value   Over the last 2 weeks, how often have you been bothered by any of the following problems? Feeling nervous, anxious, or on edge  1   Not being able to stop or control worrying  0   Worrying too much about different things  1   Trouble relaxing  0   Being so restless that it is hard to sit still  2   Becoming easily annoyed or irritable  2   Feeling afraid as if something awful might happen  1   MARISSA-7 Total Score  7           PHQ-9 Follow-up    Little interest or pleasure in doing things: 0 - not at all  Feeling down, depressed, or hopeless: 0 - not at all  Trouble falling or staying asleep, or sleeping too much: 0 - not at all  Feeling tired or having little energy: 0 - not at all  Poor appetite or overeatin - not at all  Feeling bad about yourself - or that you are a failure or have let yourself or your family down: 0 - not at all  Trouble concentrating on things, such as reading the newspaper or watching television: 0 - not at all  Moving or speaking so slowly that other people could have noticed  Or the opposite - being so fidgety or restless that you have been moving around a lot more than usual: 0 - not at all  Thoughts that you would be better off dead, or of hurting yourself in some way: 0 - not at all  PHQ-2 Score: 0  PHQ-9 Score: 0          Review Of Systems:      Constitutional negative   ENT negative   Cardiovascular negative   Respiratory negative   Gastrointestinal negative   Genitourinary negative   Musculoskeletal negative   Integumentary negative   Neurological negative   Endocrine negative   Other Symptoms none, all other systems are negative       Past Psychiatric History: Unchanged from prior evaluation  - Information that is bolded has been updated  Substance Abuse History:   Information that is bolded has been updated     Patient holds a medical [facility] card      Traumatic History: unchanged information from previous note  - Information that is bolded has been updated  Past Medical History:    History reviewed  No pertinent past medical history  No past medical history pertinent negatives  History reviewed  No pertinent surgical history  Allergies   Allergen Reactions    Dog Epithelium Allergy Skin Test Other (See Comments)       Substance Abuse History:    Social History     Substance and Sexual Activity   Alcohol Use Not Currently     Social History     Substance and Sexual Activity   Drug Use Never       Social History:    Social History     Socioeconomic History    Marital status: Single     Spouse name: Not on file    Number of children: Not on file    Years of education: Not on file    Highest education level: Not on file   Occupational History    Not on file   Tobacco Use    Smoking status: Never Smoker    Smokeless tobacco: Never Used   Substance and Sexual Activity    Alcohol use: Not Currently    Drug use: Never    Sexual activity: Not on file   Other Topics Concern    Not on file   Social History Narrative    Not on file     Social Determinants of Health     Financial Resource Strain:     Difficulty of Paying Living Expenses:    Food Insecurity:     Worried About Running Out of Food in the Last Year:     Ran Out of Food in the Last Year:    Transportation Needs:     Lack of Transportation (Medical):      Lack of Transportation (Non-Medical):    Physical Activity:     Days of Exercise per Week:     Minutes of Exercise per Session:    Stress:     Feeling of Stress :    Social Connections:     Frequency of Communication with Friends and Family:     Frequency of Social Gatherings with Friends and Family:     Attends [employer] Services:     Active Member of Clubs or Organizations:     Attends Club or Organization Meetings:     Marital Status:    Intimate Partner Violence:     Fear of Current or Ex-Partner:     Emotionally Abused:     Physically Abused:     Sexually Abused:        unchanged since last evaluation  Family Psychiatric History:     Unchanged from initial evaluation     History Review:  The following portions of the patient's history were reviewed and updated as appropriate: allergies, current medications, past family history, past medical history, past social history, past surgical history and problem list          OBJECTIVE:     Vital signs in last 24 hours:    Vitals:    08/18/21 1528   BP: 126/76   Weight: 56 5 kg (124 lb 9 6 oz)       Mental Status Evaluation:    Appearance age appropriate, casually dressed, dressed appropriately   Behavior normal, pleasant, cooperative, appears anxious   Speech normal rate, normal volume, normal pitch, fluent, clear, coherent   Mood anxious   Affect mood-congruent   Thought Processes organized, logical, coherent, goal directed, linear, normal rate of thoughts   Associations intact associations   Thought Content no overt delusions   Perceptual Disturbances: no auditory hallucinations, no visual hallucinations   Abnormal Thoughts  Risk Potential Suicidal ideation - None  Homicidal ideation - None  Potential for aggression - No   Orientation oriented to person, place, time/date and situation   Memory recent and remote memory grossly intact   Consciousness alert and awake   Attention Span Concentration Span attention span and concentration are age appropriate   Intellect appears to be of average intelligence   Insight fair   Judgement fair   Muscle Strength and  Gait normal muscle strength and normal muscle tone, normal gait and normal balance   Motor activity no abnormal movements   Language no difficulty naming common objects, no difficulty repeating a phrase, no difficulty writing a sentence   Fund of Knowledge adequate knowledge of current events  adequate fund of knowledge regarding past history  adequate fund of knowledge regarding vocabulary    Pain none   Pain Scale 0       Laboratory Results: I have personally reviewed all pertinent laboratory/tests results    Most Recent Labs:   Lab Results   Component Value Date    WBC 5 95 11/02/2020    RBC 4 79 11/02/2020    HGB 14 7 11/02/2020    HCT 43 6 11/02/2020     11/02/2020    RDW 12 7 11/02/2020    NEUTROABS 2 90 11/02/2020    K 4 1 11/02/2020     (H) 11/02/2020    CO2 27 11/02/2020    BUN 13 11/02/2020    CREATININE 0 99 11/02/2020    CALCIUM 9 1 11/02/2020    AST 25 11/02/2020    ALT 21 11/02/2020    ALKPHOS 53 11/02/2020    FCD1WJPTGQDY 1 130 11/02/2020       Suicide/Homicide Risk Assessment:    Risk of Harm to Self:  The following ratings are based on assessment at the time of the interview  Demographic risk factors include: , never , age: young adult (15-24)  Historical Risk Factors include: history of anxiety  Recent Specific Risk Factors include: current anxiety symptoms  Protective Factors: no current suicidal ideation, ability to adapt to change, able to manage anger well, access to mental health treatment, compliant with mental health treatment, connection to community, effective coping skills, good health, good self-esteem, having a desire to be alive, healthy fear of risky behaviors and pain, impulse control, opportunities to participate in community, responsibilities and duties to others, stable living environment, stable job, sense of determination, sense of importance of health and wellness, sense of personal control, supportive family members  Weapons: rifle  The following steps have been taken to ensure weapons are properly secured: locked  Based on today's assessment, Niya Torrez presents the following risk of harm to self: low    Risk of Harm to Others: The following ratings are based on assessment at the time of the interview  Demographic Risk Factors include: [age] years of age  Historical Risk Factors include: none  Recent Specific Risk Factors include: access to weapons    Protective Factors: no current homicidal ideation, ability to adapt to change, able to manage anger well, access to mental health treatment, compliant with mental health treatment, connection to community, effective coping skills, effective decision-making skills, effective problem solving skills, good self-esteem, impulse control, moral system, responsibilities and duties to others, safe and stable living environment, sense of personal control, supportive family  Weapons: rifle  The following steps have been taken to ensure weapons are properly secured: locked  Based on today's assessment, Musa Reyes presents the following risk of harm to others: low    The following interventions are recommended: contracts for safety at present - agrees to go to ED if feeling unsafe, contracts for safety at present - agrees to call Crisis Intervention Service if feeling unsafe      DSM-5 Diagnoses:    - Anxiety disorder, unspecified type  - Obsessive thinking  - Medical THC    Assessment/Plan:     Musa Reyes was personally seen and evaluated today at the [address] [place] [address] [address] outpatient clinic for follow up after initiating Buspirone 5 mg TID for anxiety and intrusive thoughts  Patient denied suicidal ideation plan or intent, denied passive death wishes and denied homicidal ideation upon direct inquiry  Treatment Recommendations/Precautions: Will continue current treatment consisting on Buspirone 5 mg TD and Atarax 25 mg Q8 prn  Will check labs on next visit      Medication management every 6 weeks  Does not want to see a therapist  Does not want to see a therapist despite recommendation  Aware of 24 hour and weekend coverage for urgent situations accessed by calling [facility] main practice number    Medications Risks/Benefits      Risks, Benefits And Possible Side Effects Of Medications:    Risks, benefits, and possible side effects of medications explained to Musa Reyes and she verbalizes understanding and agreement for treatment      Controlled Medication Discussion:     Not applicable    Psychotherapy Provided:     Individual psychotherapy provided: No     Treatment Plan:    Completed and signed during the session: Not applicable - Treatment Plan not due at this session    Note Share Disclaimer:      This note was shared with the patient    Sheryn Lesch, MD 08/18/21

## 2021-08-18 NOTE — PATIENT INSTRUCTIONS
Please continue taking Buspirone as indicated  Please call 911 or the ED if you present suicidal thoughts, intrusive thoughts, worsening symptoms or concerns

## 2021-09-08 DIAGNOSIS — F41.9 ANXIETY DISORDER, UNSPECIFIED TYPE: ICD-10-CM

## 2021-09-08 RX ORDER — BUSPIRONE HYDROCHLORIDE 5 MG/1
TABLET ORAL
Qty: 90 TABLET | Refills: 1 | Status: SHIPPED | OUTPATIENT
Start: 2021-09-08 | End: 2021-09-22

## 2021-09-22 DIAGNOSIS — F41.9 ANXIETY DISORDER, UNSPECIFIED TYPE: ICD-10-CM

## 2021-09-22 RX ORDER — BUSPIRONE HYDROCHLORIDE 5 MG/1
TABLET ORAL
Qty: 270 TABLET | Refills: 1 | Status: SHIPPED | OUTPATIENT
Start: 2021-09-22 | End: 2021-10-07

## 2021-09-22 NOTE — TELEPHONE ENCOUNTER
Pharmacy requesting #90 day prescription  Will ask covering provider to review in Dr Timmy Delatorre absence   Next appointment  10/2021

## 2021-10-01 ENCOUNTER — OFFICE VISIT (OUTPATIENT)
Dept: FAMILY MEDICINE CLINIC | Facility: CLINIC | Age: 26
End: 2021-10-01
Payer: COMMERCIAL

## 2021-10-01 VITALS
OXYGEN SATURATION: 98 % | WEIGHT: 126 LBS | DIASTOLIC BLOOD PRESSURE: 78 MMHG | HEART RATE: 73 BPM | TEMPERATURE: 98.7 F | SYSTOLIC BLOOD PRESSURE: 116 MMHG | BODY MASS INDEX: 22.32 KG/M2 | HEIGHT: 63 IN

## 2021-10-01 DIAGNOSIS — R05.9 COUGH: Primary | ICD-10-CM

## 2021-10-01 PROCEDURE — 99213 OFFICE O/P EST LOW 20 MIN: CPT | Performed by: PHYSICIAN ASSISTANT

## 2021-10-01 PROCEDURE — 3008F BODY MASS INDEX DOCD: CPT | Performed by: STUDENT IN AN ORGANIZED HEALTH CARE EDUCATION/TRAINING PROGRAM

## 2021-10-01 RX ORDER — LEVONORGESTREL / ETHINYL ESTRADIOL AND ETHINYL ESTRADIOL 150-30(84)
1 KIT ORAL DAILY
COMMUNITY
Start: 2021-08-16

## 2021-10-07 ENCOUNTER — OFFICE VISIT (OUTPATIENT)
Dept: PSYCHIATRY | Facility: CLINIC | Age: 26
End: 2021-10-07
Payer: COMMERCIAL

## 2021-10-07 DIAGNOSIS — F41.9 ANXIETY DISORDER, UNSPECIFIED TYPE: ICD-10-CM

## 2021-10-07 DIAGNOSIS — Z79.899 MEDICAL MARIJUANA USE: ICD-10-CM

## 2021-10-07 DIAGNOSIS — F42.8 OBSESSIVE THINKING: Primary | ICD-10-CM

## 2021-10-07 PROCEDURE — 99214 OFFICE O/P EST MOD 30 MIN: CPT | Performed by: STUDENT IN AN ORGANIZED HEALTH CARE EDUCATION/TRAINING PROGRAM

## 2021-10-07 PROCEDURE — 90833 PSYTX W PT W E/M 30 MIN: CPT | Performed by: STUDENT IN AN ORGANIZED HEALTH CARE EDUCATION/TRAINING PROGRAM

## 2021-10-07 PROCEDURE — 3725F SCREEN DEPRESSION PERFORMED: CPT | Performed by: STUDENT IN AN ORGANIZED HEALTH CARE EDUCATION/TRAINING PROGRAM

## 2021-10-07 PROCEDURE — 1036F TOBACCO NON-USER: CPT | Performed by: STUDENT IN AN ORGANIZED HEALTH CARE EDUCATION/TRAINING PROGRAM

## 2021-10-07 RX ORDER — BUSPIRONE HYDROCHLORIDE 7.5 MG/1
7.5 TABLET ORAL 3 TIMES DAILY
Qty: 90 TABLET | Refills: 0 | Status: SHIPPED | OUTPATIENT
Start: 2021-10-07 | End: 2021-10-07

## 2021-10-07 RX ORDER — BUSPIRONE HYDROCHLORIDE 7.5 MG/1
TABLET ORAL
Qty: 90 TABLET | Refills: 0 | Status: SHIPPED | OUTPATIENT
Start: 2021-10-07

## 2021-12-13 ENCOUNTER — TELEPHONE (OUTPATIENT)
Dept: PSYCHIATRY | Facility: CLINIC | Age: 26
End: 2021-12-13

## 2021-12-22 ENCOUNTER — TELEPHONE (OUTPATIENT)
Dept: FAMILY MEDICINE CLINIC | Facility: CLINIC | Age: 26
End: 2021-12-22

## 2021-12-22 DIAGNOSIS — Z11.59 SCREENING FOR VIRAL DISEASE: Primary | ICD-10-CM

## 2021-12-22 PROCEDURE — 0241U HB NFCT DS VIR RESP RNA 4 TRGT: CPT

## 2021-12-24 LAB
FLUAV RNA RESP QL NAA+PROBE: NEGATIVE
FLUBV RNA RESP QL NAA+PROBE: NEGATIVE
RSV RNA RESP QL NAA+PROBE: NEGATIVE
SARS-COV-2 RNA RESP QL NAA+PROBE: NEGATIVE

## 2021-12-27 ENCOUNTER — OFFICE VISIT (OUTPATIENT)
Dept: URGENT CARE | Facility: CLINIC | Age: 26
End: 2021-12-27
Payer: COMMERCIAL

## 2021-12-27 ENCOUNTER — TELEPHONE (OUTPATIENT)
Dept: FAMILY MEDICINE CLINIC | Facility: CLINIC | Age: 26
End: 2021-12-27

## 2021-12-27 VITALS
RESPIRATION RATE: 18 BRPM | SYSTOLIC BLOOD PRESSURE: 122 MMHG | HEART RATE: 84 BPM | TEMPERATURE: 98.8 F | DIASTOLIC BLOOD PRESSURE: 80 MMHG | OXYGEN SATURATION: 98 %

## 2021-12-27 DIAGNOSIS — Z76.89 RETURN TO WORK EVALUATION: Primary | ICD-10-CM

## 2021-12-27 PROCEDURE — 99213 OFFICE O/P EST LOW 20 MIN: CPT | Performed by: PREVENTIVE MEDICINE

## 2021-12-27 NOTE — LETTER
December 27, 2021    Patient:  Dong Black  YOB: 1995  Date of Last Encounter: Visit date not found    To whom it may concern:    Dong Black has tested negative for COVID-19 (Coronavirus)  She may return to work on 12/27/2021      Sincerely,        Mason Smith MD

## 2022-01-02 NOTE — PROGRESS NOTES
[facility]        NAME: Jossy Edgar is a 27 y o  female  : 1995    MRN: [id number]  DATE: 2022  TIME: 1:50 PM    Assessment and Plan   Return to work evaluation [Z76 [id number]]  1  Return to work evaluation           Patient Instructions       Follow up with PCP in 3-5 days  Proceed to  ER if symptoms worsen  Chief Complaint     Chief Complaint   Patient presents with   Ricco Villatoro needs return to work note that she is cleared to go back         History of Present Illness       [de-identified] years old female presents for return to work evaluation  She had a negative COVID test   She is symptoms free  Review of Systems   Review of Systems   Constitutional: Negative  HENT: Negative  Eyes: Negative  Respiratory: Negative  Cardiovascular: Negative  All other systems reviewed and are negative  Current Medications       Current Outpatient Medications:     busPIRone (BUSPAR) 7 5 mg tablet, TAKE 1 TABLET BY MOUTH 3 TIMES A DAY , Disp: 90 tablet, Rfl: 0    cetirizine (ZyrTEC) 10 mg tablet, Take 10 mg by mouth daily, Disp: , Rfl:     hydrOXYzine HCL (ATARAX) 25 mg tablet, Take 1 tablet (25 mg total) by mouth every 8 (eight) hours as needed for anxiety, Disp: 30 tablet, Rfl: 0    Levonorgest-Eth Estrad -Day 0 15-0 03 &0 01 MG TABS, Take 1 tablet by mouth daily, Disp: , Rfl:     Current Allergies     Allergies as of 2021 - Reviewed 2021   Allergen Reaction Noted    Dog epithelium allergy skin test Other (See Comments) 2016            The following portions of the patient's history were reviewed and updated as appropriate: allergies, current medications, past family history, past medical history, past social history, past surgical history and problem list      History reviewed  No pertinent past medical history  History reviewed  No pertinent surgical history  History reviewed  No pertinent family history        Medications have been verified  Objective   /80   Pulse 84   Temp 98 8 °F (37 1 °C) (Temporal)   Resp 18   SpO2 98%        Physical Exam     Physical Exam  Vitals and nursing note reviewed  Constitutional:       Appearance: Normal appearance  She is well-developed  HENT:      Head: Normocephalic  Right Ear: Tympanic membrane normal       Left Ear: Tympanic membrane normal       Nose: Nose normal       Mouth/Throat:      Mouth: Mucous membranes are moist    Eyes:      Extraocular Movements: Extraocular movements intact  Pupils: Pupils are equal, round, and reactive to light  Cardiovascular:      Rate and Rhythm: Normal rate and regular rhythm  Pulmonary:      Effort: Pulmonary effort is normal       Breath sounds: Normal breath sounds  Musculoskeletal:      Cervical back: Normal range of motion and neck supple  Neurological:      Mental Status: She is alert

## 2022-03-03 ENCOUNTER — TELEPHONE (OUTPATIENT)
Dept: PSYCHIATRY | Facility: CLINIC | Age: 27
End: 2022-03-03

## 2022-03-03 NOTE — TELEPHONE ENCOUNTER
Pt returned my call  She has moved to [place]  You can discharge her  Please let me know why the discharge summary is done  No letter will be sent to the patient since she is aware of the discharge

## 2022-03-03 NOTE — TELEPHONE ENCOUNTER
LVM  Pt last seen in October 2021  Cancelled subsequent appt in December  Per Dr Fela Robert, I contacted the patient requesting her to call the office to either schedule an appt or let us know she is no longer interested in treatment

## 2022-03-04 ENCOUNTER — DOCUMENTATION (OUTPATIENT)
Dept: PSYCHIATRY | Facility: CLINIC | Age: 27
End: 2022-03-04

## 2022-03-04 NOTE — PSYCH
[facility] [place] [facility]    Name and Date of Birth:  Odessia Boeck 26 y o  1995    First visit Date:  7/14/21 Discharge Date: 3/4/2022     Referral source: family physician    Discharge Type: Transfer ot another provider after she moved out of state    Discharge Diagnosis:     OCD  Medical THC use  Anxiety disorder, unspecified type        Treating Physician: Dr Colt Kaplan     Treatment Complications: Did not return for follow up  Admit with discharge: No    Prognosis at time of discharge: Unable to assess    Presenting Problems/Pertinent Findings:      As per Dr Ramirez Pop HPI on 7/14/21    "Odessia Boeck is a 27 y o  female with a past psychiatric history significant for anxiety who presents to the [address] [place] [address] E outpatient clinic for intake assessment  Karelcindy Powerdarya presents with complaints of anxiety since 2019, without any clear triggers that she could attribute her symptoms to  She holds a card for medical marijuana for anxiety and states it helps at night, but still feels anxious during the day       Patient states she often gets panic attacks  They started last summer and used to have daily panic attacks while driving  Patient then opted to get under the care of a PCP for anxiety and panic attacks  Within the past couple of months her panic attacks have spaced to 3/ week, still mostly when driving, with catastrophic thought prcess where she fears of getting into a car accident with another car or by having a deer run into her car, and the proceeds to envision/imagine her being admitted to a hospital and imminent sense that she was going to die  She states she presents palpitations, shortness of breath and chest tightness  It usually lasts about 3-5 minutes    She denies ever being in a MVA accident herself, and denies  knowing anyone who has had a MVA, or any violent or accidental death      She states she also gets panic attacks and catastrophic thinking at night, with intrusive thoughts that her home will be broken into, even though she lives in a safe neighborhood and her home has never been broken into  She denies knowing any people whose homes had been broken into       Patient denies changes with sleep patterns, and denies changes in appetite, energy levels, attention or concentration capacity, denies psychomotor retardation and denies suicidal ideations, plan or intent  She denies suicidal attempts, and denies ever being admitted into [facility]      She denies visual or auditory hallcuniations     She denies the use of substances other than her medically prescribed marijuana, for which she hold a card for      She denies symptoms consistent w/ true or ptsd      Recent labs from November 2020 show normal TSH and blood work      Patient states there is a locked hunting turner at home, which is locked without ammunition "          Therapist: Outside therapist    Course of Treatment: Medication Management    Summary of Treatment Progress:     Connor Martinez was seen for initial Psychiatric Evaluation and medication management  Lethality Risk at the time of discharge from clinic: LOW  At the time of the most recent psychiatric assessment, Connor Martinez was future-oriented, forward-thinking, and demonstrated ability to act in a self-preserving manner as evidenced by volitionally presenting to the clinic, seeking treatment  To mitigate future risk, patient should adhere to treatment recommendations, avoid alcohol/illicit substance use, utilize community-based resources and familiar support, and prioritize mental health treatment         Suicide/Homicide Risk Assessment:     Suicide/Homicide Risk Assessment:     Risk of Harm to Self:  · The following ratings are based on assessment at the time of the interview  · Demographic risk factors include: , never , age: young adult (15-24)  · Historical Risk Factors include: chronic anxiety symptoms  · Recent Specific Risk Factors include: significant anxiety symptoms  · Protective Factors: no current suicidal ideation, ability to adapt to change, access to mental health treatment, compliant with medications, compliant with mental health treatment, effective coping skills, effective decision-making skills, good health, good self-esteem, having a desire to be alive, having a sense of purpose or meaning in life, healthy fear of risky behaviors and pain, impulse control, opportunities to contribute to community, resiliency, stable living environment, stable job, sense of determination, strong relationships, supportive family, supportive friends  · Weapons: locked hunting turner that belongs to her brother/stepfather   The following steps have been taken to ensure weapons are properly secured: locked  · Based on today's assessment, Nataliia Brunson presents the following risk of harm to self: low     Risk of Harm to Others:  · The following ratings are based on assessment at the time of the interview  · Demographic Risk Factors include: none  · Historical Risk Factors include: none  · Recent Specific Risk Factors include: none  · Protective Factors: no current homicidal ideation  · Weapons: rifle  The following steps have been taken to ensure weapons are properly secured: locked, no bullets  · Based on today's assessment, Nataliia Brunson presents the following risk of harm to others: none     The following interventions are recommended: contracts for safety at present - agrees to go to ED if feeling unsafe, contracts for safety at present - agrees to call Crisis Intervention Service if feeling unsafe  Although patient's acute lethality risk is LOW, long-term/chronic lethality risk is mildly elevated given __   However, at the current moment, Nataliia Brunson is future-oriented, forward-thinking, and demonstrates ability to act in a self-preserving manner as evidenced by volitionally presenting to the clinic today, seeking treatment  Additionally, Linda Simms sits throughout the assessment wearing personal protective gear (ie mask) in the context of an ongoing viral pandemic, suggesting a will and desire to live  At this juncture, inpatient hospitalization is not currently warranted  To mitigate future risk, patient should adhere to treatment recommendations, avoid alcohol/illicit substance use, utilize community-based resources and familiar support, and prioritize mental health treatment  Past Psychiatric History:        Inpatient psychiatric admissions: none  Prior outpatient psychiatric linkage: none  Past/current psychotherapy: past therapy at age 7 after death of her father  History of suicidal attempts/gestures: none  History of violence/aggressive behaviors: none  Psychotropic medication trials: escitalopram (states felt more depressed), sertraline (states did not worlk for her symptoms)  Substance abuse inpatient/outpatient rehabilitation: none    Traumatic History:       Abuse: none  Other Traumatic Events: none    Past Medical History: Allery induced asthma    Allergies: Allergies   Allergen Reactions    Dog Epithelium Allergy Skin Test Other (See Comments)       Substance Abuse History:     Social History     Substance and Sexual Activity   Drug Use Never     Social History     Substance and Sexual Activity   Alcohol Use Not Currently       Family Psychiatric History:     Father w/ depression, ptsd and cocaine use disorder,  from OD  History of anxiety in mother and sister  No other family psychiatric history  No history of suicide in the family  Substance Abuse History:     No history of ETOH, or tobacco abuse  No past legal actions or arrests secondary to substance intoxication  The patient denies prior DWIs/DUIs  No history of outpatient/inpatient rehabilitation programs   Linda Simms does not exhibit objective evidence of substance withdrawal during today's examination nor does Linda Simms appear under the influence of any psychoactive substance  She is on medical marijuana  Social History/Trauma History/Past Psychiatric History:      Developmental: Denies a history of milestone/developmental delay  Denies a history of in-utero exposure to toxins/illicit substances  There is no documented history of IEP or need for special education  Education: post college graduate work or degree  Marital history: single  Living arrangement, social support: lives with mother, stepfather, 2 sisters, CHIP, 2 brothers and 2 CHUYITA  Main support is a close friend  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[place] [address]  Access to firearms: Denies direct access to weapons/firearms  Trula New has no history of arrests or violence with a deadly weapon  Social History     Socioeconomic History    Marital status: Single     Spouse name: Not on file    Number of children: Not on file    Years of education: Not on file    Highest education level: Not on file   Occupational History    Not on file   Tobacco Use    Smoking status: Never Smoker    Smokeless tobacco: Never Used   Substance and Sexual Activity    Alcohol use: Not Currently    Drug use: Never    Sexual activity: Not on file   Other Topics Concern    Not on file   Social History Narrative    Not on file     Social Determinants of Health     Financial Resource Strain: Not on file   Food Insecurity: Not on file   Transportation Needs: Not on file   Physical Activity: Not on file   Stress: Not on file   Social Connections: Not on file   Intimate Partner Violence: Not on file   Housing Stability: Not on file       History Review: The following portions of the patient's history were reviewed and updated as appropriate: allergies, current medications, past family history, past medical history, past social history, past surgical history and problem list  Reviewed on 3/4/22        MENTAL STATUS EVALUATION (at time of most recent visit on 10/7/21): Appearance age appropriate, dressed appropriately, excessive make-up   Behavior pleasant, appears anxious, guarded   Speech normal rate, normal volume, normal pitch, fluent, clear, coherent   Mood anxious   Affect constricted   Thought Processes organized, logical, coherent, goal directed, linear, normal rate of thoughts, normal abstract reasoning   Associations intact associations   Thought Content no overt delusions   Perceptual Disturbances: no auditory hallucinations, no visual hallucinations   Abnormal Thoughts  Risk Potential Suicidal ideation - None  Homicidal ideation - None  Potential for aggression - No   Orientation oriented to person, place, time/date and situation   Memory recent and remote memory grossly intact   Consciousness alert and awake   Attention Span Concentration Span attention span and concentration are age appropriate   Intellect appears to be of average intelligence   Insight fair   Judgement fair   Muscle Strength and  Gait normal muscle strength and normal muscle tone, normal gait and normal balance   Motor activity no abnormal movements   Language no difficulty naming common objects, no difficulty repeating a phrase, no difficulty writing a sentence   Fund of Knowledge adequate knowledge of current events  adequate fund of knowledge regarding past history  adequate fund of knowledge regarding vocabulary    Pain none   Pain Scale 0          To what extent did Rochelle achieve her goals?: Some      Criteria for Discharge: Requested to follow up with another psychiatrist  Corinne [place] out of state      Aftercare Recommendations:      Follow up with therapist recommended   Follow up with psychiatrist recommended        Discharge Medications:     Current Outpatient Medications:     busPIRone (BUSPAR) 7 5 mg tablet, TAKE 1 TABLET BY MOUTH 3 TIMES A DAY , Disp: 90 tablet, Rfl: 0    cetirizine (ZyrTEC) 10 mg tablet, Take 10 mg by mouth daily, Disp: , Rfl:    hydrOXYzine HCL (ATARAX) 25 mg tablet, Take 1 tablet (25 mg total) by mouth every 8 (eight) hours as needed for anxiety, Disp: 30 tablet, Rfl: 0    Levonorgest-Eth Estrad 91-Day 0 15-0 03 &0 01 MG TABS, Take 1 tablet by mouth daily, Disp: , Rfl:      Describe ability and willingness to work and solve mental problems:     May have difficulty solving her mental health problems    Colt Kaplan MD 03/04/22

## 2022-03-09 ENCOUNTER — DOCUMENTATION (OUTPATIENT)
Dept: PSYCHIATRY | Facility: CLINIC | Age: 27
End: 2022-03-09

## 2022-03-09 NOTE — PSYCH
[facility] [place]    Name and Date of Birth:  Jossy Edgar 26 y o  1995    First visit Date:  7/14/21 Discharge Date: 3/9/2022     Referral source: family physician    Discharge Type: Transfer to another provider    Discharge Diagnosis:     Obsessive thinking  Anxiety unspecified  Medical [facility]      Treating Physician: Dr Ellen Guajardo     Treatment Complications: Requested discharge  Moved ot another state     Admit with discharge: No    Prognosis at time of discharge: Fair    Presenting Problems/Pertinent Findings:      As per Dr Patel Breath HPI on 7/14/21    "Jossy Edgar is a 27 y o  female with a past psychiatric history significant for anxiety who presents to the [address] [place] [address] [address] outpatient clinic for intake assessment  Meño Sams presents with complaints of anxiety since 2019, without any clear triggers that she could attribute her symptoms to  She holds a card for medical marijuana for anxiety and states it helps at night, but still feels anxious during the day       Patient states she often gets panic attacks  They started last summer and used to have daily panic attacks while driving  Patient then opted to get under the care of a PCP for anxiety and panic attacks  Within the past couple of months her panic attacks have spaced to 3/ week, still mostly when driving, with catastrophic thought prcess where she fears of getting into a car accident with another car or by having a deer run into her car, and the proceeds to envision/imagine her being admitted to a hospital and imminent sense that she was going to die  She states she presents palpitations, shortness of breath and chest tightness  It usually lasts about 3-5 minutes    She denies ever being in a MVA accident herself, and denies  knowing anyone who has had a MVA, or any violent or accidental death      She states she also gets panic attacks and catastrophic thinking at night, with intrusive thoughts that her home will be broken into, even though she lives in a safe neighborhood and her home has never been broken into  She denies knowing any people whose homes had been broken into       Patient denies changes with sleep patterns, and denies changes in appetite, energy levels, attention or concentration capacity, denies psychomotor retardation and denies suicidal ideations, plan or intent  She denies suicidal attempts, and denies ever being admitted into [facility]      She denies visual or auditory hallcuniations     She denies the use of substances other than her medically prescribed marijuana, for which she hold a card for      She denies symptoms consistent w/ true or ptsd      Recent labs from November 2020 show normal TSH and blood work      Patient sttaes there is a locked hunting turner at home, which is locked without ammunition  "      Therapist: None; constantly declined    Course of Treatment: Psychiatric Evaluation and Medication Management    Summary of Treatment Progress:     Ruddy Fabry was seen for initial Psychiatric Evaluation and medication management  Lethality Risk at the time of discharge from clinic: LOW  At the time of the most recent psychiatric assessment, Ruddy Fabry was future-oriented, forward-thinking, and demonstrated ability to act in a self-preserving manner as evidenced by volitionally presenting to the clinic, seeking treatment  To mitigate future risk, patient should adhere to treatment recommendations, avoid alcohol/illicit substance use, utilize community-based resources and familiar support, and prioritize mental health treatment         Suicide/Homicide Risk Assessment:     Risk of Harm to Self:  · The following ratings are based on assessment at the time of the interview  · Demographic risk factors include: , never , age: young adult (15-24)  · Historical Risk Factors include: history of anxiety  · Recent Specific Risk Factors include: current anxiety symptoms  · Protective Factors: no current suicidal ideation, ability to adapt to change, able to manage anger well, access to mental health treatment, compliant with mental health treatment, connection to community, effective coping skills, good health, good self-esteem, having a desire to be alive, healthy fear of risky behaviors and pain, impulse control, opportunities to participate in community, responsibilities and duties to others, stable living environment, stable job, sense of determination, sense of importance of health and wellness, sense of personal control, supportive family members  · [address]  The following steps have been taken to ensure weapons are properly secured: locked  · Based on today's assessment, Rochelle presents the following risk of harm to self: low     Risk of Harm to Others:  · The following ratings are based on assessment at the time of the interview  · Demographic Risk Factors include: [age] years of age  · Historical Risk Factors include: none  · Recent Specific Risk Factors include: access to weapons  · Protective Factors: no current homicidal ideation, ability to adapt to change, able to manage anger well, access to mental health treatment, compliant with mental health treatment, connection to community, effective coping skills, effective decision-making skills, effective problem solving skills, good self-esteem, impulse control, moral system, responsibilities and duties to others, safe and stable living environment, sense of personal control, supportive family  · [address]   The following steps have been taken to ensure weapons are properly secured: locked  · Based on today's assessment, Rochelle presents the following risk of harm to others: low     The following interventions are recommended: contracts for safety at present - agrees to go to ED if feeling unsafe, contracts for safety at present - agrees to call Crisis Intervention Service if feeling unsafe      Past Psychiatric History:     Inpatient psychiatric admissions: none  Prior outpatient psychiatric linkage: none  Past/current psychotherapy: past therapy at age 7 after death of her father  History of suicidal attempts/gestures: none  History of violence/aggressive behaviors: none  Psychotropic medication trials: escitalopram (states felt more depressed), sertraline (states did not worlk for her symptoms)  Substance abuse inpatient/outpatient rehabilitation: none    Traumatic History:     Abuse: none  Other Traumatic Events: none    Past Medical History: Allery induced asthma    Allergies: Allergies   Allergen Reactions    Dog Epithelium Allergy Skin Test Other (See Comments)       Substance Abuse History:     Social History     Substance and Sexual Activity   Drug Use Never     Social History     Substance and Sexual Activity   Alcohol Use Not Currently       Family Psychiatric History:     No family history on file  Social History/Trauma History/Past Psychiatric History:    Social History     Socioeconomic History    Marital status: Single     Spouse name: Not on file    Number of children: Not on file    Years of education: Not on file    Highest education level: Not on file   Occupational History    Not on file   Tobacco Use    Smoking status: Never Smoker    Smokeless tobacco: Never Used   Substance and Sexual Activity    Alcohol use: Not Currently    Drug use: Never    Sexual activity: Not on file   Other Topics Concern    Not on file   Social History Narrative    Not on file     Social Determinants of Health     Financial Resource Strain: Not on file   Food Insecurity: Not on file   Transportation Needs: Not on file   Physical Activity: Not on file   Stress: Not on file   Social Connections: Not on file   Intimate Partner Violence: Not on file   Housing Stability: Not on file       History Review:   The following portions of the patient's history were reviewed and updated as appropriate: allergies, current medications, past family history, past medical history, past social history, past surgical history and problem list  Reviewed on 3/9/22  MENTAL STATUS EVALUATION (at time of most recent visit on 10/7/21): Mental Status Evaluation:     Appearance age appropriate, dressed appropriately, excessive make-up   Behavior pleasant, appears anxious, guarded   Speech normal rate, normal volume, normal pitch, fluent, clear, coherent   Mood anxious   Affect constricted   Thought Processes organized, logical, coherent, goal directed, linear, normal rate of thoughts, normal abstract reasoning   Associations intact associations   Thought Content no overt delusions   Perceptual Disturbances: no auditory hallucinations, no visual hallucinations   Abnormal Thoughts  Risk Potential Suicidal ideation - None  Homicidal ideation - None  Potential for aggression - No   Orientation oriented to person, place, time/date and situation   Memory recent and remote memory grossly intact   Consciousness alert and awake   Attention Span Concentration Span attention span and concentration are age appropriate   Intellect appears to be of average intelligence   Insight fair   Judgement fair   Muscle Strength and  Gait normal muscle strength and normal muscle tone, normal gait and normal balance   Motor activity no abnormal movements   Language no difficulty naming common objects, no difficulty repeating a phrase, no difficulty writing a sentence   Fund of Knowledge adequate knowledge of current events  adequate fund of knowledge regarding past history  adequate fund of knowledge regarding vocabulary    Pain none   Pain Scale 0         To what extent did Rochelle achieve her goals?: Some      Criteria for Discharge: moved ot another state      Aftercare Recommendations:      Follow up with psychiatrist recommended        Discharge Medications:     Current Outpatient Medications:   busPIRone (BUSPAR) 7 5 mg tablet, TAKE 1 TABLET BY MOUTH 3 TIMES A DAY , Disp: 90 tablet, Rfl: 0    cetirizine (ZyrTEC) 10 mg tablet, Take 10 mg by mouth daily, Disp: , Rfl:     hydrOXYzine HCL (ATARAX) 25 mg tablet, Take 1 tablet (25 mg total) by mouth every 8 (eight) hours as needed for anxiety, Disp: 30 tablet, Rfl: 0    Levonorgest-Eth Estrad 91-Day 0 15-0 03 &0 01 MG TABS, Take 1 tablet by mouth daily, Disp: , Rfl:      Describe ability and willingness to work and solve mental problems:     May have difficulty solving her mental health problems    Kat Berkowitz MD 03/09/22

## 2022-03-10 ENCOUNTER — TELEPHONE (OUTPATIENT)
Dept: PSYCHIATRY | Facility: CLINIC | Age: 27
End: 2022-03-10